# Patient Record
Sex: FEMALE | Race: WHITE | NOT HISPANIC OR LATINO | Employment: OTHER | URBAN - METROPOLITAN AREA
[De-identification: names, ages, dates, MRNs, and addresses within clinical notes are randomized per-mention and may not be internally consistent; named-entity substitution may affect disease eponyms.]

---

## 2021-01-27 ENCOUNTER — CLINICAL SUPPORT (OUTPATIENT)
Dept: URGENT CARE | Facility: CLINIC | Age: 71
End: 2021-01-27
Payer: MEDICARE

## 2021-01-27 DIAGNOSIS — Z11.9 ENCOUNTER FOR SCREENING EXAMINATION FOR INFECTIOUS DISEASE: Primary | ICD-10-CM

## 2021-01-27 LAB
CTP QC/QA: YES
SARS-COV-2 RDRP RESP QL NAA+PROBE: NEGATIVE

## 2021-01-27 PROCEDURE — 99211 OFF/OP EST MAY X REQ PHY/QHP: CPT | Mod: S$GLB,CS,, | Performed by: PHYSICIAN ASSISTANT

## 2021-01-27 PROCEDURE — U0002: ICD-10-PCS | Mod: QW,CR,S$GLB, | Performed by: PHYSICIAN ASSISTANT

## 2021-01-27 PROCEDURE — 99211 PR OFFICE/OUTPT VISIT, EST, LEVL I: ICD-10-PCS | Mod: S$GLB,CS,, | Performed by: PHYSICIAN ASSISTANT

## 2021-01-27 PROCEDURE — U0002 COVID-19 LAB TEST NON-CDC: HCPCS | Mod: QW,CR,S$GLB, | Performed by: PHYSICIAN ASSISTANT

## 2023-12-19 ENCOUNTER — OFFICE VISIT (OUTPATIENT)
Dept: URGENT CARE | Facility: CLINIC | Age: 73
End: 2023-12-19
Payer: MEDICARE

## 2023-12-19 ENCOUNTER — HOSPITAL ENCOUNTER (OUTPATIENT)
Dept: RADIOLOGY | Facility: CLINIC | Age: 73
Discharge: HOME | End: 2023-12-19
Attending: NURSE PRACTITIONER
Payer: MEDICARE

## 2023-12-19 VITALS
BODY MASS INDEX: 23.81 KG/M2 | HEIGHT: 66 IN | HEART RATE: 66 BPM | TEMPERATURE: 98 F | DIASTOLIC BLOOD PRESSURE: 67 MMHG | RESPIRATION RATE: 16 BRPM | WEIGHT: 148.13 LBS | OXYGEN SATURATION: 97 % | SYSTOLIC BLOOD PRESSURE: 139 MMHG

## 2023-12-19 DIAGNOSIS — R06.2 WHEEZING: ICD-10-CM

## 2023-12-19 DIAGNOSIS — R05.1 ACUTE COUGH: ICD-10-CM

## 2023-12-19 DIAGNOSIS — R53.83 FATIGUE, UNSPECIFIED TYPE: ICD-10-CM

## 2023-12-19 DIAGNOSIS — H92.03 OTALGIA OF BOTH EARS: ICD-10-CM

## 2023-12-19 DIAGNOSIS — J30.89 ENVIRONMENTAL AND SEASONAL ALLERGIES: Primary | ICD-10-CM

## 2023-12-19 DIAGNOSIS — R09.82 POSTNASAL DRIP: ICD-10-CM

## 2023-12-19 PROCEDURE — 99214 OFFICE O/P EST MOD 30 MIN: CPT | Mod: S$GLB,,, | Performed by: NURSE PRACTITIONER

## 2023-12-19 PROCEDURE — 71046 X-RAY EXAM CHEST 2 VIEWS: CPT | Mod: S$GLB,,, | Performed by: RADIOLOGY

## 2023-12-19 PROCEDURE — 71046 XR CHEST PA AND LATERAL: ICD-10-PCS | Mod: S$GLB,,, | Performed by: RADIOLOGY

## 2023-12-19 PROCEDURE — 99214 PR OFFICE/OUTPT VISIT, EST, LEVL IV, 30-39 MIN: ICD-10-PCS | Mod: S$GLB,,, | Performed by: NURSE PRACTITIONER

## 2023-12-19 RX ORDER — METOPROLOL SUCCINATE 25 MG/1
25 TABLET, EXTENDED RELEASE ORAL DAILY
COMMUNITY

## 2023-12-19 RX ORDER — ROSUVASTATIN CALCIUM 5 MG/1
5 TABLET, COATED ORAL DAILY
COMMUNITY

## 2023-12-19 RX ORDER — OMEPRAZOLE 40 MG/1
40 CAPSULE, DELAYED RELEASE ORAL DAILY
COMMUNITY

## 2023-12-19 RX ORDER — PROMETHAZINE HYDROCHLORIDE AND DEXTROMETHORPHAN HYDROBROMIDE 6.25; 15 MG/5ML; MG/5ML
5 SYRUP ORAL EVERY 6 HOURS PRN
Qty: 118 ML | Refills: 0 | Status: SHIPPED | OUTPATIENT
Start: 2023-12-19 | End: 2023-12-26

## 2023-12-19 RX ORDER — AMLODIPINE BESYLATE 5 MG/1
1 TABLET ORAL DAILY
COMMUNITY
Start: 2023-03-30

## 2023-12-19 RX ORDER — ALBUTEROL SULFATE 90 UG/1
1-2 AEROSOL, METERED RESPIRATORY (INHALATION) EVERY 4 HOURS PRN
Qty: 18 G | Refills: 0 | Status: SHIPPED | OUTPATIENT
Start: 2023-12-19

## 2023-12-19 NOTE — PROGRESS NOTES
"Subjective:      Patient ID: Brionna Espinzoa is a 72 y.o. female.    Vitals:  height is 5' 5.79" (1.671 m) and weight is 67.2 kg (148 lb 2.4 oz). Her oral temperature is 98 °F (36.7 °C). Her blood pressure is 139/67 and her pulse is 66. Her respiration is 16 and oxygen saturation is 97%.     Chief Complaint: Cough (Ear pain/)    72 year old female presents for evaluation of productive cough x 1 week and bilateral ear pain and pressure that began on yesterday. Recent exposure to construction/home renovations. OTC  antihistamines, Delsym and Flonase. Treated for sinusitis 10/30/2023    Cough  This is a new problem. The current episode started 1 to 4 weeks ago. The problem has been gradually worsening. The problem occurs every few minutes. The cough is Productive of sputum. Associated symptoms include ear congestion, ear pain (left greater than right ear pressire), headaches, nasal congestion, postnasal drip and wheezing (with lying down). Pertinent negatives include no chest pain, chills, fever, heartburn, hemoptysis, myalgias, rash, rhinorrhea, sore throat, shortness of breath, sweats or weight loss. Associated symptoms comments: Fatigue. The symptoms are aggravated by lying down. Treatments tried: Delsym and nasal spray. The treatment provided no relief. There is no history of asthma, bronchiectasis, bronchitis, COPD, emphysema, environmental allergies or pneumonia.       Constitution: Positive for fatigue. Negative for appetite change, chills, sweating and fever.   HENT:  Positive for ear pain (left greater than right ear pressire), postnasal drip and sinus pressure. Negative for congestion and sore throat.    Neck: neck negative.   Cardiovascular: Negative.  Negative for chest pain.   Eyes: Negative.    Respiratory:  Positive for cough, sputum production and wheezing (with lying down). Negative for chest tightness, bloody sputum and shortness of breath.    Gastrointestinal: Negative.  Negative for nausea, " vomiting, diarrhea and heartburn.   Endocrine: negative.   Genitourinary: Negative.    Musculoskeletal: Negative.  Negative for muscle ache.   Skin: Negative.  Negative for rash.   Allergic/Immunologic: Positive for seasonal allergies. Negative for environmental allergies and sneezing.   Neurological:  Positive for headaches.   Hematologic/Lymphatic: Negative.    Psychiatric/Behavioral: Negative.        Objective:     Physical Exam   Constitutional: She is oriented to person, place, and time. She appears well-developed. She is cooperative.  Non-toxic appearance. She does not appear ill. No distress. normalawake  HENT:   Head: Normocephalic and atraumatic.   Ears:   Right Ear: Hearing, external ear and ear canal normal. No cerumen not present. Tympanic membrane is injected. Tympanic membrane is not scarred, not perforated, not erythematous, not retracted and not bulging. impacted cerumen  Left Ear: Hearing, external ear and ear canal normal. No cerumen not present. Tympanic membrane is injected. Tympanic membrane is not scarred, not perforated, not erythematous, not retracted and not bulging. impacted cerumen  Nose: Mucosal edema and congestion present. No rhinorrhea or nasal deformity. No epistaxis. Right sinus exhibits no maxillary sinus tenderness and no frontal sinus tenderness. Left sinus exhibits no maxillary sinus tenderness and no frontal sinus tenderness.   Mouth/Throat: Uvula is midline, oropharynx is clear and moist and mucous membranes are normal. Mucous membranes are moist. No trismus in the jaw. Normal dentition. No uvula swelling. Cobblestoning present. No oropharyngeal exudate, posterior oropharyngeal edema or posterior oropharyngeal erythema. Tonsils are 0 on the right. Tonsils are 0 on the left. No tonsillar exudate.   Eyes: Conjunctivae and lids are normal. Pupils are equal, round, and reactive to light. Right eye exhibits no discharge. Left eye exhibits no discharge. No scleral icterus.  Extraocular movement intact   Neck: Trachea normal and phonation normal. Neck supple. No edema present. No erythema present. No neck rigidity present.   Cardiovascular: Normal rate, regular rhythm, normal heart sounds and normal pulses.   Pulmonary/Chest: Effort normal and breath sounds normal. No stridor. No respiratory distress. She has no decreased breath sounds. She has no wheezes. She has no rhonchi. She has no rales. She exhibits no tenderness.   Abdominal: Normal appearance.   Musculoskeletal: Normal range of motion.         General: No deformity. Normal range of motion.   Neurological: no focal deficit. She is alert and oriented to person, place, and time. She exhibits normal muscle tone. Coordination normal.   Skin: Skin is warm, dry, intact, not diaphoretic and not pale.   Psychiatric: Her speech is normal and behavior is normal. Mood, judgment and thought content normal.   Nursing note and vitals reviewed.  XR CHEST PA AND LATERAL    Result Date: 12/19/2023  EXAM:  XR CHEST PA AND LATERAL CLINICAL HISTORY: Cough COMPARISON: 01/03/2023. FINDINGS: No confluent airspace opacity or consolidation.  Calcified granuloma left midlung zone similar to prior.  There is no evidence of pleural effusion, pneumothorax, or other acute pulmonary disease.  The cardiomediastinal silhouette is within normal limits.  No acute osseous abnormality is evident.       Moderate scattered degenerative change and atherosclerotic disease.      No acute cardiopulmonary abnormality. Finalized on: 12/19/2023 4:03 PM By:  Rogelio Savage MD [facility]# [id number]      2023-12-19 16:05:[id number]    [facility]       Assessment:     1. Environmental and seasonal allergies    2. Wheezing    3. Acute cough    4. Fatigue, unspecified type    5. Otalgia of both ears    6. Postnasal drip        Plan:     Patient presents with symptoms that are consistent with allergy exacerbation. Decision to perform CXR to rule out pneumonia, (-) findings discussed. Exam negative  for otitis media/bacterial sinusitis/bacterial tonsillitis/bronchitis/pneumonia. Plan is to manage symptoms and prevent worsening/discussed with patient who verbalizes understanding.      Environmental and seasonal allergies    Wheezing  -     XR CHEST PA AND LATERAL; Future; Expected date: 12/19/2023  -     albuterol (VENTOLIN HFA) 90 mcg/actuation inhaler; Inhale 1-2 puffs into the lungs every 4 (four) hours as needed for Wheezing. Rescue  Dispense: 18 g; Refill: 0    Acute cough  -     XR CHEST PA AND LATERAL; Future; Expected date: 12/19/2023  -     promethazine-dextromethorphan (PROMETHAZINE-DM) 6.25-15 mg/5 mL Syrp; Take 5 mLs by mouth every 6 (six) hours as needed.  Dispense: 118 mL; Refill: 0    Fatigue, unspecified type    Otalgia of both ears    Postnasal drip             Patient Instructions   Rest  Hydration/increase fluids  Continue OTC antihistamines as directed  Mucinex OTC as directed for sinus pressure  Phenergan DM every 6 hours as needed for cough  Albuterol 1-2 puffs every 4-6 hours as needed  Typical course and duration of illness discussed  Signs and symptoms of worsening discussed  Follow up as needed/with worsening

## 2023-12-19 NOTE — PATIENT INSTRUCTIONS
Rest  Hydration/increase fluids  Continue OTC antihistamines as directed  Mucinex OTC as directed for sinus pressure  Phenergan DM every 6 hours as needed for cough  Albuterol 1-2 puffs every 4-6 hours as needed  Typical course and duration of illness discussed  Signs and symptoms of worsening discussed  Follow up as needed/with worsening